# Patient Record
Sex: FEMALE | Race: WHITE | Employment: STUDENT | URBAN - METROPOLITAN AREA
[De-identification: names, ages, dates, MRNs, and addresses within clinical notes are randomized per-mention and may not be internally consistent; named-entity substitution may affect disease eponyms.]

---

## 2017-01-31 RX ORDER — METHYLPHENIDATE HYDROCHLORIDE 36 MG/1
36 TABLET ORAL DAILY
Qty: 30 TABLET | Refills: 0 | Status: SHIPPED | OUTPATIENT
Start: 2017-03-02 | End: 2017-04-01

## 2017-01-31 RX ORDER — METHYLPHENIDATE HYDROCHLORIDE 36 MG/1
36 TABLET ORAL DAILY
Qty: 30 TABLET | Refills: 0 | Status: SHIPPED | OUTPATIENT
Start: 2017-01-31 | End: 2017-03-02

## 2017-01-31 RX ORDER — METHYLPHENIDATE HYDROCHLORIDE 36 MG/1
36 TABLET ORAL DAILY
Qty: 30 TABLET | Refills: 0 | Status: SHIPPED | OUTPATIENT
Start: 2017-04-01 | End: 2017-05-01

## 2017-01-31 NOTE — TELEPHONE ENCOUNTER
Patient notified of Rx's for Methylphenidate 36 mg x3 ready for  at  [facility] location. Patient voiced understanding.

## 2017-01-31 NOTE — TELEPHONE ENCOUNTER
Pt is going to need a refill on rx: Pt is away is a school and her mother can pick this script up 02/01 or 02/02 because she leaves Friday to go down to pt school.        Current Outpatient Prescriptions:  Methylphenidate HCl ER (CONCERTA) 36 MG Oral Tab CR

## 2017-01-31 NOTE — TELEPHONE ENCOUNTER
I have not seen pt since 11/2015. Mita Marcos i will approve it this time but not again until seen by me.

## 2017-02-24 ENCOUNTER — OFFICE VISIT (OUTPATIENT)
Dept: FAMILY MEDICINE CLINIC | Facility: CLINIC | Age: 22
End: 2017-02-24

## 2017-02-24 VITALS
SYSTOLIC BLOOD PRESSURE: 102 MMHG | HEART RATE: 86 BPM | HEIGHT: 63 IN | BODY MASS INDEX: 18.96 KG/M2 | TEMPERATURE: 99 F | DIASTOLIC BLOOD PRESSURE: 71 MMHG | WEIGHT: 107 LBS

## 2017-02-24 DIAGNOSIS — J30.1 SEASONAL ALLERGIC RHINITIS DUE TO POLLEN: ICD-10-CM

## 2017-02-24 DIAGNOSIS — J32.9 SINUSITIS, UNSPECIFIED CHRONICITY, UNSPECIFIED LOCATION: Primary | ICD-10-CM

## 2017-02-24 DIAGNOSIS — R05.9 COUGH: ICD-10-CM

## 2017-02-24 DIAGNOSIS — R11.11 VOMITING WITHOUT NAUSEA, INTRACTABILITY OF VOMITING NOT SPECIFIED, UNSPECIFIED VOMITING TYPE: ICD-10-CM

## 2017-02-24 PROCEDURE — 99214 OFFICE O/P EST MOD 30 MIN: CPT | Performed by: FAMILY MEDICINE

## 2017-02-24 PROCEDURE — 99212 OFFICE O/P EST SF 10 MIN: CPT | Performed by: FAMILY MEDICINE

## 2017-02-24 RX ORDER — FLUTICASONE PROPIONATE 50 MCG
2 SPRAY, SUSPENSION (ML) NASAL DAILY
Qty: 1 BOTTLE | Refills: 6 | Status: SHIPPED | OUTPATIENT
Start: 2017-02-24 | End: 2017-06-24

## 2017-02-24 RX ORDER — CLARITHROMYCIN 500 MG/1
500 TABLET, COATED ORAL 2 TIMES DAILY
Qty: 20 TABLET | Refills: 0 | Status: SHIPPED | OUTPATIENT
Start: 2017-02-24 | End: 2017-03-06

## 2017-02-24 NOTE — PROGRESS NOTES
Patient ID: Ml Wharton is a 22year old female. HPI  Patient presents with:  ER F/U: [facility]   Cold    [facility]/2017 after she was vomiting that morning for about 5 times.   She went to the hospital and Left Ear: Tympanic membrane normal.   Nose: Mucosal edema and rhinorrhea present. No sinus tenderness. Right sinus exhibits no maxillary sinus tenderness. Left sinus exhibits no maxillary sinus tenderness.    Nose: Pale boggy nasal mucosa with clear rhino already. Follow up if symptoms persist.  Take medicine (if given) as prescribed. Approach to treatment discussed and patient/family member understands and agrees to plan.            Garrett Trejo, DO  2/24/2017

## 2017-04-06 ENCOUNTER — TELEPHONE (OUTPATIENT)
Dept: FAMILY MEDICINE CLINIC | Facility: CLINIC | Age: 22
End: 2017-04-06

## 2017-04-06 DIAGNOSIS — Z30.9 ENCOUNTER FOR CONTRACEPTIVE MANAGEMENT, UNSPECIFIED TYPE: Primary | ICD-10-CM

## 2017-04-06 NOTE — TELEPHONE ENCOUNTER
I'm sorry, the phone room does not book appts for OBGYN. However, will pt need a referral to see Dr. Elliot Merlin? She has an HMO plan.

## 2017-04-06 NOTE — TELEPHONE ENCOUNTER
Dr. Miah Adhikari please see message below.  Please advise patient also dose see Gyne Dr. Genoveva Scanlon

## 2017-04-06 NOTE — TELEPHONE ENCOUNTER
Pt requesting apt for arm birthcontrol to be changed, Pt states last was inserted august 2014. Please Advise.

## 2017-04-10 NOTE — TELEPHONE ENCOUNTER
Thank you Dr. Sharda Da Silva. I called pt and informed her of message below and let her know that she has a referral on file. Pt was transferred to obgyn to book an appt.

## 2017-05-12 ENCOUNTER — OFFICE VISIT (OUTPATIENT)
Dept: OBGYN CLINIC | Facility: CLINIC | Age: 22
End: 2017-05-12

## 2017-05-12 VITALS
BODY MASS INDEX: 19.38 KG/M2 | DIASTOLIC BLOOD PRESSURE: 60 MMHG | HEIGHT: 62.7 IN | WEIGHT: 108 LBS | SYSTOLIC BLOOD PRESSURE: 92 MMHG | HEART RATE: 93 BPM

## 2017-05-12 DIAGNOSIS — Z01.419 ENCOUNTER FOR GYNECOLOGICAL EXAMINATION WITHOUT ABNORMAL FINDING: Primary | ICD-10-CM

## 2017-05-12 PROCEDURE — 99395 PREV VISIT EST AGE 18-39: CPT | Performed by: OBSTETRICS & GYNECOLOGY

## 2017-05-12 NOTE — PROGRESS NOTES
Well Woman Exam    HPI:  The patient is a Sheryl female who presents for annual exam. She has a Nexplanon in place that was placed by Kirkwood Goldmann in 2014. Due out in August or before August. She would like another Nexplanon.  No periods for two years and starting t outpatient prescriptions:   •  Fluticasone Propionate 50 MCG/ACT Nasal Suspension, 2 sprays by Nasal route daily. , Disp: 1 Bottle, Rfl: 6  •  Methylphenidate HCl ER (CONCERTA) 36 MG Oral Tab CR, Take 1 tablet (36 mg total) by mouth every morning., Disp: 30 guidelines with the patient   2. No pap smear based on age- to be done at 23yo  2. Contraception:   1. Nexplanon  2. To be replaced prior to Aug 2017- reviewed risks and benefits of Nexplanon   3. Reviewed need for condom use  3. Breast Health:     1.  Revi

## 2017-05-25 ENCOUNTER — OFFICE VISIT (OUTPATIENT)
Dept: OBGYN CLINIC | Facility: CLINIC | Age: 22
End: 2017-05-25

## 2017-05-25 VITALS
DIASTOLIC BLOOD PRESSURE: 66 MMHG | WEIGHT: 107 LBS | SYSTOLIC BLOOD PRESSURE: 98 MMHG | HEART RATE: 91 BPM | BODY MASS INDEX: 19 KG/M2

## 2017-05-25 DIAGNOSIS — Z32.00 PREGNANCY EXAMINATION OR TEST, PREGNANCY UNCONFIRMED: Primary | ICD-10-CM

## 2017-05-25 DIAGNOSIS — Z30.46 ENCOUNTER FOR REMOVAL OF SUBDERMAL CONTRACEPTIVE IMPLANT: ICD-10-CM

## 2017-05-25 DIAGNOSIS — Z30.017 INSERTION OF IMPLANTABLE SUBDERMAL CONTRACEPTIVE: ICD-10-CM

## 2017-05-25 PROCEDURE — 11983 REMOVE/INSERT DRUG IMPLANT: CPT | Performed by: OBSTETRICS & GYNECOLOGY

## 2017-05-25 PROCEDURE — 81025 URINE PREGNANCY TEST: CPT | Performed by: OBSTETRICS & GYNECOLOGY

## 2017-05-25 NOTE — PROCEDURES
Procedure note: Nexplanon Removal and Insertion     Consent was obtained after explaining the risks and benefits of the procedure. Removal was done first.  The Nexplanon was localized in the right arm just under the skin.  4ml of 1% lidocaine with epinephri

## 2017-11-06 RX ORDER — METHYLPHENIDATE HYDROCHLORIDE 36 MG/1
36 TABLET ORAL DAILY
Qty: 30 TABLET | Refills: 0 | Status: SHIPPED | OUTPATIENT
Start: 2017-11-06 | End: 2017-12-06

## 2017-11-06 NOTE — TELEPHONE ENCOUNTER
Rx was last sent 1/31/17. Message routed to provider to confirm if refill can be sent.      Refill Protocol Appointment Criteria  · Appointment scheduled in the past 6 months or in the next 3 months  Recent Outpatient Visits            5 months ago Pregnanc

## 2018-02-15 ENCOUNTER — TELEPHONE (OUTPATIENT)
Dept: OBGYN CLINIC | Facility: CLINIC | Age: 23
End: 2018-02-15

## 2018-02-15 NOTE — TELEPHONE ENCOUNTER
No further recommendation. Patient to continue to monitor. Known side effect with Nexplanon is irregular bleeding.

## 2018-02-15 NOTE — TELEPHONE ENCOUNTER
Pt would like to speak to [facility] regarding her Bc. Pt states she's been having an irregular period.

## 2018-02-15 NOTE — TELEPHONE ENCOUNTER
Pt has nexplanon in place since 5/17. This is pt's second nexplanon. Pt typically does not have a period on nexplanon. Pt states that for the past month she has had bleeding every 2 weeks. Today it is just dark spotting.  Pt states nexplanon seems to be in

## 2018-02-19 ENCOUNTER — TELEPHONE (OUTPATIENT)
Dept: FAMILY MEDICINE CLINIC | Facility: CLINIC | Age: 23
End: 2018-02-19

## 2018-02-19 DIAGNOSIS — L70.9 ACNE, UNSPECIFIED ACNE TYPE: Primary | ICD-10-CM

## 2018-02-19 NOTE — TELEPHONE ENCOUNTER
Patient is using her roommates medication for her acne and it seems to be helping. She would like to see a Dermatologist to get her own prescription. Please sign referral order if you agree. Thank you.

## 2018-03-09 ENCOUNTER — TELEPHONE (OUTPATIENT)
Dept: OBGYN CLINIC | Facility: CLINIC | Age: 23
End: 2018-03-09

## 2018-03-09 ENCOUNTER — OFFICE VISIT (OUTPATIENT)
Dept: OBGYN CLINIC | Facility: CLINIC | Age: 23
End: 2018-03-09

## 2018-03-09 ENCOUNTER — APPOINTMENT (OUTPATIENT)
Dept: LAB | Facility: HOSPITAL | Age: 23
End: 2018-03-09
Attending: OBSTETRICS & GYNECOLOGY
Payer: COMMERCIAL

## 2018-03-09 VITALS
SYSTOLIC BLOOD PRESSURE: 104 MMHG | DIASTOLIC BLOOD PRESSURE: 69 MMHG | BODY MASS INDEX: 18 KG/M2 | HEART RATE: 81 BPM | WEIGHT: 99.19 LBS

## 2018-03-09 DIAGNOSIS — N92.6 IRREGULAR BLEEDING: ICD-10-CM

## 2018-03-09 DIAGNOSIS — Z12.4 SCREENING FOR MALIGNANT NEOPLASM OF CERVIX: Primary | ICD-10-CM

## 2018-03-09 DIAGNOSIS — N92.6 IRREGULAR BLEEDING: Primary | ICD-10-CM

## 2018-03-09 LAB
HCG SERPL QL: NEGATIVE
TSH SERPL-ACNC: 1.2 UIU/ML (ref 0.45–5.33)

## 2018-03-09 PROCEDURE — 84443 ASSAY THYROID STIM HORMONE: CPT

## 2018-03-09 PROCEDURE — 36415 COLL VENOUS BLD VENIPUNCTURE: CPT

## 2018-03-09 PROCEDURE — 99213 OFFICE O/P EST LOW 20 MIN: CPT | Performed by: OBSTETRICS & GYNECOLOGY

## 2018-03-09 PROCEDURE — 84703 CHORIONIC GONADOTROPIN ASSAY: CPT

## 2018-03-09 NOTE — H&P
HPI:  The patient is a 32-year-old female who presents to discuss irregular bleeding. The patient has a Nexplanon contraceptive device was placed in May 2017.   She has had episodes where she skips cycles however over the last month she has had intermitt Hypertension Maternal Grandfather    • Hypertension Paternal Grandfather    • Glaucoma Other    • Arrhythmia Neg        MEDICATIONS:    Current Outpatient Prescriptions:   •  Methylphenidate HCl ER (CONCERTA) 36 MG Oral Tab CR, Take 1 tablet (36 mg total) cervix  -     THINPREP PAP WITH HPV REFLEX REQUEST B; Future  -     THINPREP PAP WITH HPV REFLEX REQUEST B  -     THINPREP PAP WITH HPV REFLEX REQUEST;  Future  -     THINPREP PAP WITH HPV REFLEX REQUEST    Irregular bleeding  -     CHLAMYDIA/GONOCOCCUS, NA

## 2018-03-11 LAB
C TRACH DNA SPEC QL NAA+PROBE: NEGATIVE
N GONORRHOEA DNA SPEC QL NAA+PROBE: NEGATIVE

## 2018-03-12 LAB — T VAGINALIS RRNA SPEC QL NAA+PROBE: NEGATIVE

## 2018-04-25 RX ORDER — METHYLPHENIDATE HYDROCHLORIDE 36 MG/1
36 TABLET ORAL DAILY
Qty: 30 TABLET | Refills: 0 | Status: SHIPPED | OUTPATIENT
Start: 2018-05-23 | End: 2018-06-22

## 2018-04-25 RX ORDER — METHYLPHENIDATE HYDROCHLORIDE 36 MG/1
36 TABLET ORAL DAILY
Qty: 30 TABLET | Refills: 0 | Status: SHIPPED | OUTPATIENT
Start: 2018-04-25 | End: 2018-05-25

## 2018-04-25 RX ORDER — METHYLPHENIDATE HYDROCHLORIDE 36 MG/1
36 TABLET ORAL EVERY MORNING
Qty: 30 TABLET | Refills: 0 | Status: SHIPPED | OUTPATIENT
Start: 2018-04-25 | End: 2018-11-13

## 2018-04-25 NOTE — TELEPHONE ENCOUNTER
Left detail message on patient personal voicemail of Rx for Concerta  x3 ready for  at  [facility] location.

## 2018-05-16 ENCOUNTER — OFFICE VISIT (OUTPATIENT)
Dept: DERMATOLOGY CLINIC | Facility: CLINIC | Age: 23
End: 2018-05-16

## 2018-05-16 ENCOUNTER — TELEPHONE (OUTPATIENT)
Dept: DERMATOLOGY CLINIC | Facility: CLINIC | Age: 23
End: 2018-05-16

## 2018-05-16 VITALS — WEIGHT: 105 LBS | BODY MASS INDEX: 18.61 KG/M2 | HEIGHT: 63 IN

## 2018-05-16 DIAGNOSIS — L70.0 ACNE VULGARIS: Primary | ICD-10-CM

## 2018-05-16 PROCEDURE — 99202 OFFICE O/P NEW SF 15 MIN: CPT | Performed by: DERMATOLOGY

## 2018-05-16 PROCEDURE — 99212 OFFICE O/P EST SF 10 MIN: CPT | Performed by: DERMATOLOGY

## 2018-05-16 RX ORDER — ADAPALENE AND BENZOYL PEROXIDE 3; 25 MG/G; MG/G
1 GEL TOPICAL DAILY
Qty: 45 G | Refills: 6 | Status: SHIPPED | OUTPATIENT
Start: 2018-05-16 | End: 2021-04-06

## 2018-05-16 RX ORDER — CLINDAMYCIN PHOSPHATE 10 MG/ML
1 LOTION TOPICAL 2 TIMES DAILY
Qty: 60 ML | Refills: 11 | Status: SHIPPED | OUTPATIENT
Start: 2018-05-16 | End: 2018-06-15

## 2018-05-19 NOTE — TELEPHONE ENCOUNTER
Which medication? Pt has more than 1 rx. epiduo forte? Did pharmacy run coupon--regular generic epiduo available from [facility].

## 2018-05-21 NOTE — TELEPHONE ENCOUNTER
s/w [facility] - they ran the coupon and I went through for $75 pt just hasn't picked it up yet.

## 2018-05-27 NOTE — PROGRESS NOTES
Angela Freedman is a 25year old female. Patient presents with:  Acne: random breakouts facial ,chin ,has tried epiduo with good results             Cefprozil;  Cefzil; Codeine    Current Outpatient Prescriptions:  Adapalene-Benzoyl Peroxide (EPIDUO [place]  Cefzil                  RASH  Codeine                 NAUSEA AND VOMITING    Past Medical History:   Diagnosis Date   • ALLERGIC RHINITIS    • ASTHMA    • Asthma     Albuterol/flovent prn   • Tonsillar hypertrophy 2008    Tonsillectomy nourished,oriented to person, place, time, and situation,in no apparent distress  HEENT: atraumatic, normocephalic  NECK: supple,no adenopathy,  EXTREMITIES: no cyanosis, clubbing or edema    SKIN:  multiple inflammatory papules,   nodules  , prominent atr past 48 hour(s)). Meds This Visit:      Imaging Orders:  None     Referral Orders:  No orders of the defined types were placed in this encounter.         5/27/2018  Stefan Shankar      The patient indicates understanding of these issues and agrees to the

## 2018-05-29 ENCOUNTER — OFFICE VISIT (OUTPATIENT)
Dept: FAMILY MEDICINE CLINIC | Facility: CLINIC | Age: 23
End: 2018-05-29

## 2018-05-29 VITALS
WEIGHT: 105 LBS | HEART RATE: 65 BPM | DIASTOLIC BLOOD PRESSURE: 63 MMHG | SYSTOLIC BLOOD PRESSURE: 98 MMHG | TEMPERATURE: 98 F | BODY MASS INDEX: 19 KG/M2

## 2018-05-29 DIAGNOSIS — N39.0 URINARY TRACT INFECTION WITHOUT HEMATURIA, SITE UNSPECIFIED: Primary | ICD-10-CM

## 2018-05-29 PROCEDURE — 99212 OFFICE O/P EST SF 10 MIN: CPT | Performed by: PHYSICIAN ASSISTANT

## 2018-05-29 PROCEDURE — 81002 URINALYSIS NONAUTO W/O SCOPE: CPT | Performed by: PHYSICIAN ASSISTANT

## 2018-05-29 PROCEDURE — 99213 OFFICE O/P EST LOW 20 MIN: CPT | Performed by: PHYSICIAN ASSISTANT

## 2018-05-29 RX ORDER — CIPROFLOXACIN 250 MG/1
250 TABLET, FILM COATED ORAL 2 TIMES DAILY
Qty: 6 TABLET | Refills: 0 | Status: SHIPPED | OUTPATIENT
Start: 2018-05-29 | End: 2018-06-01

## 2018-05-29 NOTE — PROGRESS NOTES
HPI:    Patient ID: Feliz Simmonds is a 25year old female. Patient presents for pain with urination for past two days that was severe this morning and has since improved. She denies any back pain or flank pain, fever or chills, nausea or vomiting.   Ky File alert and oriented to person, place, and time. No cranial nerve deficit. Skin: Skin is warm and dry. Psychiatric: She has a normal mood and affect. Vitals reviewed.              ASSESSMENT/PLAN:   Urinary tract infection without hematuria, site unspec

## 2018-09-19 ENCOUNTER — OFFICE VISIT (OUTPATIENT)
Dept: FAMILY MEDICINE CLINIC | Facility: CLINIC | Age: 23
End: 2018-09-19

## 2018-09-19 VITALS
HEART RATE: 71 BPM | WEIGHT: 102 LBS | BODY MASS INDEX: 18.07 KG/M2 | HEIGHT: 63 IN | DIASTOLIC BLOOD PRESSURE: 68 MMHG | SYSTOLIC BLOOD PRESSURE: 103 MMHG

## 2018-09-19 DIAGNOSIS — R10.13 EPIGASTRIC PAIN: Primary | ICD-10-CM

## 2018-09-19 DIAGNOSIS — J30.1 SEASONAL ALLERGIC RHINITIS DUE TO POLLEN: ICD-10-CM

## 2018-09-19 PROCEDURE — 99213 OFFICE O/P EST LOW 20 MIN: CPT | Performed by: NURSE PRACTITIONER

## 2018-09-19 NOTE — PROGRESS NOTES
HPI    Pt here for abdominal bloating and cramping in am for past month or so. Had lactose problems as a child. Just started a new job and moved back home from college. Has decreased coffee intake and is now drinking green tea.  Diet is usually pretty h Social History    Socioeconomic History      Marital status: Single      Spouse name: Not on file      Number of children: Not on file      Years of education: Not on file      Highest education level: Not on file    Social Needs      Financial resou Ear: Tympanic membrane and ear canal normal. No cerumen present  Eyes: Conjunctivae and EOM are normal. Pupils are equal, round, and reactive to light. Right eye exhibits no discharge. Left eye exhibits no discharge. Neck: Normal range of motion.  Neck delgadillo

## 2018-09-19 NOTE — PATIENT INSTRUCTIONS
Heidy Spar ALLERGIC RHINITIS    -Take otc allergy medications as directed (over the counter, generic Claritin, Zyrtec or Allegra)    -use of fluticasone nasal spray as advised (Flonase)-this is now available as a generic, over the counter spray (fluticasone) if your

## 2018-11-13 ENCOUNTER — OFFICE VISIT (OUTPATIENT)
Dept: FAMILY MEDICINE CLINIC | Facility: CLINIC | Age: 23
End: 2018-11-13

## 2018-11-13 VITALS
SYSTOLIC BLOOD PRESSURE: 105 MMHG | HEART RATE: 82 BPM | BODY MASS INDEX: 17.89 KG/M2 | WEIGHT: 101 LBS | DIASTOLIC BLOOD PRESSURE: 69 MMHG | HEIGHT: 63 IN

## 2018-11-13 DIAGNOSIS — J30.1 SEASONAL ALLERGIC RHINITIS DUE TO POLLEN: ICD-10-CM

## 2018-11-13 DIAGNOSIS — Z00.00 WELL ADULT EXAM: Primary | ICD-10-CM

## 2018-11-13 PROBLEM — R10.13 EPIGASTRIC PAIN: Status: RESOLVED | Noted: 2018-09-19 | Resolved: 2018-11-13

## 2018-11-13 PROCEDURE — 99395 PREV VISIT EST AGE 18-39: CPT | Performed by: NURSE PRACTITIONER

## 2018-11-13 NOTE — PATIENT INSTRUCTIONS
Prevention Guidelines, Women Ages 25 to 44  Screening tests and vaccines are an important part of managing your health. A screening test is done to find possible disorders or diseases in people who don't have any symptoms.  The goal is to find a disease e Type 2 diabetes All women with prediabetes Every year   Gonorrhea Sexually active women at increased risk for infection At routine exams   Hepatitis C Anyone at increased risk At routine exams   HIV All women should be tested at least once for HIV between Meningococcal Women at increased risk for infection should talk with their healthcare provider 1 or more doses   Pneumococcal conjugate vaccine (PCV13) and pneumococcal polysaccharide vaccine (PPSV23) Women at increased risk for infection should talk with © 4774-1456 [address]. [address] [facility], [address]. All rights reserved. This information is not intended as a substitute for professional medical care. Always follow your healthcare professional's instructions.       Everythin

## 2018-11-13 NOTE — PROGRESS NOTES
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 Does not need TB test.     Takes allergy meds prn; is not having any flares with asthma. Eats healthy, tries to exercise regularly.     Last pap-never ; has ha • Other (Other[other]) Maternal Grandmother         Factor V Leiden   • Diabetes Maternal Grandfather    • Hypertension Maternal Grandfather    • Hypertension Paternal Grandfather    • Glaucoma Other    • Arrhythmia Neg        Social History    Socioecon normal. No cerumen present  Left Ear: Tympanic membrane and ear canal normal. No cerumen present  Mouth/Throat: Oropharynx is clear and moist. No posterior oropharyngeal erythema.    Pale, boggy turbinates bilaterally     Eyes: Conjunctivae and EOM are norm

## 2018-11-15 PROBLEM — Z01.419 WELL WOMAN EXAM WITH ROUTINE GYNECOLOGICAL EXAM: Status: ACTIVE | Noted: 2018-11-15

## 2018-12-20 ENCOUNTER — LAB ENCOUNTER (OUTPATIENT)
Dept: LAB | Age: 23
End: 2018-12-20
Attending: FAMILY MEDICINE
Payer: COMMERCIAL

## 2018-12-20 ENCOUNTER — OFFICE VISIT (OUTPATIENT)
Dept: FAMILY MEDICINE CLINIC | Facility: CLINIC | Age: 23
End: 2018-12-20

## 2018-12-20 VITALS
BODY MASS INDEX: 18.04 KG/M2 | HEART RATE: 85 BPM | HEIGHT: 63 IN | WEIGHT: 101.81 LBS | DIASTOLIC BLOOD PRESSURE: 62 MMHG | SYSTOLIC BLOOD PRESSURE: 100 MMHG

## 2018-12-20 DIAGNOSIS — R11.0 NAUSEA: ICD-10-CM

## 2018-12-20 DIAGNOSIS — R11.0 NAUSEA: Primary | ICD-10-CM

## 2018-12-20 PROCEDURE — 80053 COMPREHEN METABOLIC PANEL: CPT

## 2018-12-20 PROCEDURE — 99213 OFFICE O/P EST LOW 20 MIN: CPT | Performed by: FAMILY MEDICINE

## 2018-12-20 PROCEDURE — 84443 ASSAY THYROID STIM HORMONE: CPT

## 2018-12-20 PROCEDURE — 36415 COLL VENOUS BLD VENIPUNCTURE: CPT

## 2018-12-20 PROCEDURE — 99212 OFFICE O/P EST SF 10 MIN: CPT | Performed by: FAMILY MEDICINE

## 2018-12-20 PROCEDURE — 85025 COMPLETE CBC W/AUTO DIFF WBC: CPT

## 2018-12-20 PROCEDURE — 83013 H PYLORI (C-13) BREATH: CPT

## 2018-12-20 RX ORDER — OMEPRAZOLE 20 MG/1
20 CAPSULE, DELAYED RELEASE ORAL
Qty: 30 CAPSULE | Refills: 2 | Status: SHIPPED | OUTPATIENT
Start: 2018-12-20 | End: 2019-12-20

## 2018-12-20 NOTE — PROGRESS NOTES
Caryn Sosa is a 26year old female. Patient presents with:  Abdominal Pain  Diarrhea    HPI:   Pt reports she saw Marsha Arauz NP in September for same symptoms. Reports gets nauseated sometimes but so hungry. Occurs when eats. Takes zantac and helps.  Carlos HELICOBACTER PYLORI BREATH TEST, ADULT (>17); Future  - CBC WITH DIFFERENTIAL WITH PLATELET; Future  - COMP METABOLIC PANEL (14); Future  - TSH W REFLEX TO FREE T4; Future      The patient indicates understanding of these issues and agrees to the plan.

## 2018-12-26 ENCOUNTER — LAB ENCOUNTER (OUTPATIENT)
Dept: LAB | Facility: HOSPITAL | Age: 23
End: 2018-12-26
Attending: FAMILY MEDICINE
Payer: COMMERCIAL

## 2018-12-26 ENCOUNTER — TELEPHONE (OUTPATIENT)
Dept: FAMILY MEDICINE CLINIC | Facility: CLINIC | Age: 23
End: 2018-12-26

## 2018-12-26 DIAGNOSIS — R11.0 NAUSEA: Primary | ICD-10-CM

## 2018-12-26 PROCEDURE — 83013 H PYLORI (C-13) BREATH: CPT

## 2018-12-26 NOTE — TELEPHONE ENCOUNTER
Dr Edwige Ball, please note. Arnoldo Barroso, [address], is with patient who got a phone call from [facility], said she needed to re-do H Pylori test and lab needed a new order. Current order says in process. Arnoldo Barroso will contact lab to see if new order is needed.  Marcell Boyd

## 2018-12-27 NOTE — PROGRESS NOTES
Tests are all normal. Please continue with current treatment plan. Still waiting for h.  Pylori results

## 2019-01-03 ENCOUNTER — TELEPHONE (OUTPATIENT)
Dept: FAMILY MEDICINE CLINIC | Facility: CLINIC | Age: 24
End: 2019-01-03

## 2019-01-03 NOTE — TELEPHONE ENCOUNTER
Patient following on results, the following notes reviewed with patient's understanding and agreement. No other concerns at this time.       Notes recorded by Chelle Gomez, [address] on 1/2/2019 at 10:38 AM CST  Letter sent  ------    Notes recorded by Kin Angelucci

## 2019-02-02 ENCOUNTER — NURSE TRIAGE (OUTPATIENT)
Dept: OTHER | Age: 24
End: 2019-02-02

## 2019-02-02 RX ORDER — ONDANSETRON 4 MG/1
4 TABLET, ORALLY DISINTEGRATING ORAL EVERY 8 HOURS PRN
Qty: 20 TABLET | Refills: 0 | Status: SHIPPED | OUTPATIENT
Start: 2019-02-02 | End: 2020-01-24

## 2019-02-02 NOTE — TELEPHONE ENCOUNTER
Patient notified of MD's recommendation. Patient verbalized understanding. Patient states she has taken zofran before, however she will still monitor for rash.

## 2019-02-02 NOTE — TELEPHONE ENCOUNTER
Action Requested: Summary for Provider     []  Critical Lab, Recommendations Needed  [x] Need Additional Advice  []   FYI    []   Need Orders  [x] Need Medications Sent to Pharmacy  []  Other     SUMMARY: pt asking if doctor would send Zofran prescription

## 2019-09-16 ENCOUNTER — TELEPHONE (OUTPATIENT)
Dept: GASTROENTEROLOGY | Facility: CLINIC | Age: 24
End: 2019-09-16

## 2019-09-16 ENCOUNTER — OFFICE VISIT (OUTPATIENT)
Dept: GASTROENTEROLOGY | Facility: CLINIC | Age: 24
End: 2019-09-16

## 2019-09-16 VITALS
HEIGHT: 63 IN | BODY MASS INDEX: 16.12 KG/M2 | DIASTOLIC BLOOD PRESSURE: 62 MMHG | SYSTOLIC BLOOD PRESSURE: 93 MMHG | HEART RATE: 69 BPM | WEIGHT: 91 LBS

## 2019-09-16 DIAGNOSIS — R10.10 UPPER ABDOMINAL PAIN: Primary | ICD-10-CM

## 2019-09-16 DIAGNOSIS — R10.13 DYSPEPSIA: Primary | ICD-10-CM

## 2019-09-16 DIAGNOSIS — G43.A0 CYCLICAL VOMITING WITH NAUSEA, INTRACTABILITY OF VOMITING NOT SPECIFIED: ICD-10-CM

## 2019-09-16 DIAGNOSIS — R63.4 WEIGHT LOSS: ICD-10-CM

## 2019-09-16 DIAGNOSIS — R11.0 NAUSEA: ICD-10-CM

## 2019-09-16 PROBLEM — R11.15 CYCLICAL VOMITING WITH NAUSEA: Status: ACTIVE | Noted: 2019-09-16

## 2019-09-16 PROCEDURE — 99244 OFF/OP CNSLTJ NEW/EST MOD 40: CPT | Performed by: INTERNAL MEDICINE

## 2019-09-16 NOTE — TELEPHONE ENCOUNTER
Scheduled for:  EGD - [phone]  Provider Name:  Dr. Damien Dao  Date:  10/21/19  Location:  [facility]  Sedation:  [facility]  Time:  (pt is aware that [facility] will call with arrival time)  Prep:  NPO after midnight, Prep instructions were given to pt in the office, pt verbalized u

## 2019-09-16 NOTE — PATIENT INSTRUCTIONS
1. Schedule upper endoscopy (EGD) with MAC [Diagnosis: dyspepsia, weight loss, nausea]  2.  Abdominal ultrasound -call to schedule        Cyclic vomiting syndrome:  [email or web address]-

## 2019-09-16 NOTE — H&P
[facility] - Gastroenterology                                                                                                               Reason for consult: n SURGICAL HISTORY  2008    tonsillectomy   • TONSILLECTOMY        Family Hx:   Family History   Problem Relation Age of Onset   • Asthma Sister    • Asthma Maternal Grandmother    • Other (Other) Maternal Grandmother         Factor V Leiden   • Diabetes Mat breastfeeding.     Gen- Patient appears comfortable and in no acute discomfort, thin  HEENT: the sclera appears anicteric, oropharynx clear, mucus membranes appear moist  CV- regular rate and rhythm, the extremities are warm and well perfused   Lung- Moves [Diagnosis: dyspepsia, weight loss, nausea]  2. Abdominal ultrasound -call to schedule  3. Food journal, increase calories about 15-20% per day. Copy of this note sent to Dr. Irineo Najjar.     EGD consent: I have discussed the risks, benefits, and alternatives t

## 2019-10-05 ENCOUNTER — HOSPITAL ENCOUNTER (OUTPATIENT)
Dept: ULTRASOUND IMAGING | Facility: HOSPITAL | Age: 24
Discharge: HOME | End: 2019-10-05
Attending: INTERNAL MEDICINE
Payer: COMMERCIAL

## 2019-10-05 DIAGNOSIS — R10.10 UPPER ABDOMINAL PAIN: ICD-10-CM

## 2019-10-05 PROCEDURE — 76700 US EXAM ABDOM COMPLETE: CPT | Performed by: INTERNAL MEDICINE

## 2019-10-16 ENCOUNTER — IMMUNIZATION (OUTPATIENT)
Dept: FAMILY MEDICINE CLINIC | Facility: CLINIC | Age: 24
End: 2019-10-16

## 2019-10-16 DIAGNOSIS — Z23 NEED FOR VACCINATION: ICD-10-CM

## 2019-10-16 PROCEDURE — 90686 IIV4 VACC NO PRSV 0.5 ML IM: CPT | Performed by: NURSE PRACTITIONER

## 2019-10-16 PROCEDURE — 90471 IMMUNIZATION ADMIN: CPT | Performed by: NURSE PRACTITIONER

## 2019-10-21 PROCEDURE — 88305 TISSUE EXAM BY PATHOLOGIST: CPT | Performed by: INTERNAL MEDICINE

## 2019-10-21 PROCEDURE — 88312 SPECIAL STAINS GROUP 1: CPT | Performed by: INTERNAL MEDICINE

## 2019-10-22 ENCOUNTER — LAB REQUISITION (OUTPATIENT)
Dept: LAB | Facility: HOSPITAL | Age: 24
End: 2019-10-22
Payer: COMMERCIAL

## 2019-10-22 DIAGNOSIS — Z01.89 ENCOUNTER FOR OTHER SPECIFIED SPECIAL EXAMINATIONS: ICD-10-CM

## 2019-10-29 ENCOUNTER — TELEPHONE (OUTPATIENT)
Dept: GASTROENTEROLOGY | Facility: CLINIC | Age: 24
End: 2019-10-29

## 2019-10-29 RX ORDER — SUCRALFATE 1 G/1
1 TABLET ORAL
Qty: 90 TABLET | Refills: 0 | Status: SHIPPED | OUTPATIENT
Start: 2019-10-29 | End: 2019-11-28

## 2019-10-29 NOTE — TELEPHONE ENCOUNTER
Left msg for patient re: path. Non hypylori gastritis. Normal duodenum. Start Carafate 1 g TID. Possible bile acid reflux? See me in clinic in 2-3 months.

## 2020-01-24 NOTE — TELEPHONE ENCOUNTER
Review pended refill request as it does not fall under a protocol.     Last Rx: 2/2019  LOV: 1 year ago

## 2020-01-25 RX ORDER — ONDANSETRON 4 MG/1
TABLET, ORALLY DISINTEGRATING ORAL
Qty: 20 TABLET | Refills: 0 | Status: SHIPPED | OUTPATIENT
Start: 2020-01-25 | End: 2020-10-17

## 2020-05-15 ENCOUNTER — TELEPHONE (OUTPATIENT)
Dept: PEDIATRICS CLINIC | Facility: CLINIC | Age: 25
End: 2020-05-15

## 2020-05-15 NOTE — TELEPHONE ENCOUNTER
Pt requesting Nexplanon exchange. Current Nexplanon was inserted on 5/25/17. Last annual was 5/12/17. Informed pt due to Covid 19 precautions there are changes to scheduling and nurse will check with Racquel Diggs on how to proceed with scheduling. Informed pt we will call her back as soon as we have a response.    Racquel Diggs, should pt come in for annual first? If yes, do we schedule in next available or wait until June/July for annual?

## 2020-05-15 NOTE — TELEPHONE ENCOUNTER
Pt. States that she is returning the nurses call. Pt. Requesting to speak to the nurse about replacement of Nexplanon. Due on 5/25/2020.

## 2020-05-16 NOTE — TELEPHONE ENCOUNTER
Can scheduled annual first and then Nexplanon exchange to follow sometime. If I have an opening 5/21 or the following week that is fine otherwise will need to book for June.

## 2020-05-18 ENCOUNTER — PATIENT MESSAGE (OUTPATIENT)
Dept: OBGYN CLINIC | Facility: CLINIC | Age: 25
End: 2020-05-18

## 2020-05-18 NOTE — TELEPHONE ENCOUNTER
From: Roberto Memory  To: Varinder Smith MD  Sent: 5/18/2020 10:36 AM CDT  Subject: Non-Urgent Medical Question    Hi Dr. Sergei Miranda,     I just wanted to reach out because I was able to get an appointment for the 28th of May for my annual check up that I n

## 2020-05-18 NOTE — TELEPHONE ENCOUNTER
Unfortunately cannot do exchange at the same apt as annual. There is no harm in leaving it in slightly longer than expiration date. Plan for condom use after expiration date to be safe although it is likely effective.  Plan for us to exchange it in [place]

## 2020-05-28 ENCOUNTER — OFFICE VISIT (OUTPATIENT)
Dept: OBGYN CLINIC | Facility: CLINIC | Age: 25
End: 2020-05-28

## 2020-05-28 VITALS
HEIGHT: 63 IN | SYSTOLIC BLOOD PRESSURE: 99 MMHG | BODY MASS INDEX: 18.21 KG/M2 | DIASTOLIC BLOOD PRESSURE: 64 MMHG | HEART RATE: 96 BPM | WEIGHT: 102.81 LBS

## 2020-05-28 DIAGNOSIS — Z12.4 CERVICAL CANCER SCREENING: ICD-10-CM

## 2020-05-28 DIAGNOSIS — Z30.09 BIRTH CONTROL COUNSELING: ICD-10-CM

## 2020-05-28 DIAGNOSIS — Z01.419 ENCOUNTER FOR GYNECOLOGICAL EXAMINATION WITHOUT ABNORMAL FINDING: Primary | ICD-10-CM

## 2020-05-28 PROCEDURE — 99395 PREV VISIT EST AGE 18-39: CPT | Performed by: OBSTETRICS & GYNECOLOGY

## 2020-05-28 NOTE — PROGRESS NOTES
Well Woman Exam    HPI:  The patient is a 24yo female who presents today for annual exam. She has no complaints. Her Nexplanon was placed May of 2017 and due out. She would like it exchanged for another one.  She has some irregular menses, but she is happy connections:        Talks on phone: Not on file        Gets together: Not on file        Attends Baptism service: Not on file        Active member of club or organization: Not on file        Attends meetings of clubs or organizations: Not on file Denies easy bruising   Skin/Breast:  Denies any breast pain, lumps, or discharge.    Neurological:  denies headaches, blurred or double vision   Psychiatric: denies depression or anxiety       05/28/20  0957   BP: 99/64   Pulse: 96       PHYSICAL EXAM:   Co

## 2020-06-10 ENCOUNTER — OFFICE VISIT (OUTPATIENT)
Dept: OBGYN CLINIC | Facility: CLINIC | Age: 25
End: 2020-06-10

## 2020-06-10 VITALS
SYSTOLIC BLOOD PRESSURE: 120 MMHG | HEART RATE: 92 BPM | DIASTOLIC BLOOD PRESSURE: 74 MMHG | WEIGHT: 100.81 LBS | BODY MASS INDEX: 18 KG/M2

## 2020-06-10 DIAGNOSIS — Z30.46 ENCOUNTER FOR REMOVAL OF SUBDERMAL CONTRACEPTIVE IMPLANT: ICD-10-CM

## 2020-06-10 DIAGNOSIS — Z30.017 INSERTION OF IMPLANTABLE SUBDERMAL CONTRACEPTIVE: ICD-10-CM

## 2020-06-10 PROCEDURE — 11983 REMOVE/INSERT DRUG IMPLANT: CPT | Performed by: OBSTETRICS & GYNECOLOGY

## 2020-06-10 NOTE — PROCEDURES
Procedure note:  Nexplanon Removal and Reinsertion   Consent was obtained from the patient. Consent was obtained after explaining the risks and benefits of the procedure. The Implanon/Nexplanon was localized in the right arm just under the skin.  4ml of

## 2020-10-11 ENCOUNTER — HOSPITAL ENCOUNTER (OUTPATIENT)
Age: 25
Discharge: HOME | End: 2020-10-11
Attending: EMERGENCY MEDICINE
Payer: COMMERCIAL

## 2020-10-11 VITALS
TEMPERATURE: 99 F | OXYGEN SATURATION: 100 % | RESPIRATION RATE: 18 BRPM | BODY MASS INDEX: 17.72 KG/M2 | SYSTOLIC BLOOD PRESSURE: 108 MMHG | HEART RATE: 76 BPM | WEIGHT: 100 LBS | DIASTOLIC BLOOD PRESSURE: 78 MMHG | HEIGHT: 63 IN

## 2020-10-11 DIAGNOSIS — Z20.822 ENCOUNTER FOR LABORATORY TESTING FOR COVID-19 VIRUS: Primary | ICD-10-CM

## 2020-10-11 PROCEDURE — 99213 OFFICE O/P EST LOW 20 MIN: CPT | Performed by: EMERGENCY MEDICINE

## 2020-10-11 NOTE — ED PROVIDER NOTES
Patient Seen in: [facility] In [address] [place] [address]      History   Patient presents with:  Testing    Stated Complaint: TESTING    HPI    Patient is a 27-year-old female who presents to immediate care for asymptomatic COVID testing.   No direct Normal appearance.    HENT:      Right Ear: Tympanic membrane normal.      Left Ear: Tympanic membrane normal.      Nose: Nose normal.      Mouth/Throat:      Mouth: Mucous membranes are moist.   Eyes:      Extraocular Movements: Extraocular movements intac

## 2020-10-17 RX ORDER — ONDANSETRON 4 MG/1
TABLET, ORALLY DISINTEGRATING ORAL
Qty: 20 TABLET | Refills: 0 | Status: SHIPPED | OUTPATIENT
Start: 2020-10-17 | End: 2021-04-06

## 2021-04-03 ENCOUNTER — PATIENT MESSAGE (OUTPATIENT)
Dept: FAMILY MEDICINE CLINIC | Facility: CLINIC | Age: 26
End: 2021-04-03

## 2021-04-04 NOTE — TELEPHONE ENCOUNTER
From: Monica Menjivar  To: Jose Carlos Alvarez MD  Sent: 4/3/2021 2:29 PM CDT  Subject: Other    Hi ! Hope you've been well!  I just wanted to get in touch with you because I've been seeing a therapist recently to dive into my emotions and how they

## 2021-04-06 NOTE — PATIENT INSTRUCTIONS
Treating Anxiety Disorders with Medicine  An anxiety disorder can make you feel nervous or apprehensive, even without a clear reason.  In people age 72 and older, generalized anxiety disorder is one of the most commonly diagnosed anxiety disorders. Many t only the amount of medicine prescribed for you. If you think you may have taken too much, get emergency care right away. Never share your medicines with others. Store these medicines in a safe place that can't be reached by children or visitors.    Keep edwin of addiction, you may not be able to use certain medicines used to treat anxiety disorders. · Medicine interactions. Always check with your pharmacist before using any over-the-counter medicines (OTCs), including herbal supplements.  Some OTCs may interact

## 2021-04-06 NOTE — PROGRESS NOTES
HPI    Patient presents for anxiety. Recently started seeing therapy which is helping. Has been having loss of appetite lately. Anxiety is worsening and when having bad anxiety is losing appetite.   Takes zofran as needed which helps but would like to ta Highest education level: Not on file    Occupational History      Not on file    Tobacco Use      Smoking status: Never Smoker      Smokeless tobacco: Never Used    Vaping Use      Vaping Use: Never used    Substance and Sexual Activity      Alcohol use:  Delonte Muñiz HIVES  Cefzil                  RASH  Codeine                 NAUSEA AND VOMITING    Physical Exam  Vitals and nursing note reviewed. Constitutional:       General: She is not in acute distress. Appearance: Normal appearance.  She is not ill-appearing

## 2021-04-09 DIAGNOSIS — Z23 NEED FOR VACCINATION: ICD-10-CM

## 2021-04-30 ENCOUNTER — TELEPHONE (OUTPATIENT)
Dept: FAMILY MEDICINE CLINIC | Facility: CLINIC | Age: 26
End: 2021-04-30

## 2021-04-30 RX ORDER — ESCITALOPRAM OXALATE 5 MG/1
5 TABLET ORAL DAILY
Qty: 90 TABLET | Refills: 0 | Status: SHIPPED | OUTPATIENT
Start: 2021-04-30 | End: 2021-05-18

## 2021-04-30 NOTE — TELEPHONE ENCOUNTER
Patient states her insurance is recommending that script be changed to 90 day instead of 30. Patient is requesting a 90 script of medication escitalopram 5 MG Oral Tab.

## 2021-05-18 NOTE — PROGRESS NOTES
HPI    Patient presents for anxiety follow up. Seen on 4/6 and started on lexapro. Feels significantly better. Able to eat and has appetite again. Review of Systems   Psychiatric/Behavioral: The patient is nervous/anxious.     All other systems revi Sexual activity: Yes        Birth control/protection: Implant        Comment: Nexplanon    Other Topics      Concerns:        Grew up on a farm: Not Asked        History of tanning: Not Asked        Outdoor occupation: Not Asked        Pt has a pacemaker Head: Normocephalic and atraumatic. Cardiovascular:      Rate and Rhythm: Normal rate and regular rhythm. Heart sounds: Normal heart sounds. No murmur heard. Pulmonary:      Effort: Pulmonary effort is normal. No respiratory distress.       Breat

## 2021-10-23 DIAGNOSIS — F41.9 ANXIETY: ICD-10-CM

## 2021-10-25 RX ORDER — ESCITALOPRAM OXALATE 5 MG/1
TABLET ORAL
Qty: 90 TABLET | Refills: 0 | Status: SHIPPED | OUTPATIENT
Start: 2021-10-25 | End: 2022-01-24

## 2022-01-23 DIAGNOSIS — F41.9 ANXIETY: ICD-10-CM

## 2022-01-24 NOTE — TELEPHONE ENCOUNTER
Please review.  Protocol failed/No protocol      Requested Prescriptions   Pending Prescriptions Disp Refills    ESCITALOPRAM 5 MG Oral Tab [Pharmacy Med Name: ESCITALOPRAM 5 MG TABLET] 90 tablet 0     Sig: TAKE 1 TABLET BY MOUTH EVERY DAY        Psychiatric Non-Scheduled (Anti-Anxiety) Failed - 1/23/2022 12:00 AM        Failed - Appointment in last 6 or next 3 months              Recent Outpatient Visits              8 months ago [address] [place], [employer] Visit    9 months ago [facility] [address], [address] [place], [address]    Office Visit    1 year ago Insertion of implantable subdermal contraceptive    [facility], [facility], [place]    Office Visit    1 year ago Encounter for gynecological examination without abnormal finding    [facility], [address], Sweta Salmeron MD    Office Visit    2 years ago Upper abdominal pain    Tye Parekh MD    Office Visit

## 2022-01-25 RX ORDER — ESCITALOPRAM OXALATE 5 MG/1
5 TABLET ORAL DAILY
Qty: 90 TABLET | Refills: 0 | Status: SHIPPED | OUTPATIENT
Start: 2022-01-25 | End: 2022-03-09

## 2022-03-09 PROBLEM — Z00.00 WELL ADULT EXAM: Status: RESOLVED | Noted: 2018-11-13 | Resolved: 2022-03-09

## 2022-03-09 PROBLEM — Z01.419 WELL WOMAN EXAM WITH ROUTINE GYNECOLOGICAL EXAM: Status: RESOLVED | Noted: 2018-11-15 | Resolved: 2022-03-09

## 2022-03-09 PROBLEM — R10.10 UPPER ABDOMINAL PAIN: Status: RESOLVED | Noted: 2018-09-19 | Resolved: 2022-03-09

## 2022-03-24 ENCOUNTER — OFFICE VISIT (OUTPATIENT)
Dept: ORTHOPEDICS CLINIC | Facility: CLINIC | Age: 27
End: 2022-03-24
Payer: COMMERCIAL

## 2022-03-24 VITALS — WEIGHT: 107 LBS | BODY MASS INDEX: 18.96 KG/M2 | HEIGHT: 63 IN

## 2022-03-24 DIAGNOSIS — B35.1 ONYCHOMYCOSIS: Primary | ICD-10-CM

## 2022-03-24 PROCEDURE — 3008F BODY MASS INDEX DOCD: CPT | Performed by: PODIATRIST

## 2022-03-24 PROCEDURE — 99202 OFFICE O/P NEW SF 15 MIN: CPT | Performed by: PODIATRIST

## 2022-04-30 RX ORDER — ESCITALOPRAM OXALATE 5 MG/1
5 TABLET ORAL DAILY
Qty: 90 TABLET | Refills: 0 | OUTPATIENT
Start: 2022-04-30

## 2022-05-11 NOTE — ASSESSMENT & PLAN NOTE
Discussed diet-low acid, low caffeine. Low spicy and limit fried foods  Avoid lactose  May add lactose free probiotic    Supportive care discussed    Please call if symptoms worsen or are not resolving. never used

## 2022-05-16 RX ORDER — ESCITALOPRAM OXALATE 10 MG/1
10 TABLET ORAL DAILY
Qty: 90 TABLET | Refills: 0 | Status: SHIPPED | OUTPATIENT
Start: 2022-05-16 | End: 2022-07-07

## 2022-05-16 RX ORDER — ESCITALOPRAM OXALATE 10 MG/1
10 TABLET ORAL DAILY
Qty: 90 TABLET | Refills: 0 | Status: CANCELLED | OUTPATIENT
Start: 2022-05-16 | End: 2023-05-11

## 2022-05-16 NOTE — TELEPHONE ENCOUNTER
Refill passed per [place] [employer] [place], [facility] protocol. Requested Prescriptions   Pending Prescriptions Disp Refills    escitalopram (LEXAPRO) 10 MG Oral Tab 90 tablet 0     Sig: Take 1 tablet (10 mg total) by mouth daily.         Psychiatric Non-Scheduled (Anti-Anxiety) Passed - 5/16/2022 11:25 AM        Passed - Appointment in last 6 or next 3 months                  Recent Outpatient Visits              1 month ago Onychomycosis    [address]., DPM    Office Visit    2 months ago Madai Everett MD    Office Visit    12 months ago Donald Hood, [facility], APR    Office Visit    1 year ago [address], [address], [address], [address]    Office Visit    1 year ago Insertion of implantable subdermal contraceptive    Bevin Gilles, [place] Sigurd Scheuermann, MD    Office Visit

## 2022-07-07 RX ORDER — ESCITALOPRAM OXALATE 10 MG/1
10 TABLET ORAL DAILY
Qty: 90 TABLET | Refills: 0 | Status: SHIPPED | OUTPATIENT
Start: 2022-07-07 | End: 2022-07-09

## 2022-07-07 NOTE — TELEPHONE ENCOUNTER
Drew Betzy need her generic Lexapro 10mgsent to [facility] in [facility]# [phone]. She brought a good rx coupon but they need approval from Dr. Daniel Lucio because patient is out of refills. Patient out of medication for 3 days. Urgent request. [phone] with questions.

## 2023-01-09 RX ORDER — ESCITALOPRAM OXALATE 10 MG/1
TABLET ORAL
Qty: 90 TABLET | Refills: 1 | Status: SHIPPED | OUTPATIENT
Start: 2023-01-09

## 2023-02-06 ENCOUNTER — PATIENT MESSAGE (OUTPATIENT)
Dept: FAMILY MEDICINE CLINIC | Facility: CLINIC | Age: 28
End: 2023-02-06

## 2023-02-07 RX ORDER — ONDANSETRON 4 MG/1
4 TABLET, ORALLY DISINTEGRATING ORAL EVERY 8 HOURS PRN
Qty: 20 TABLET | Refills: 0 | Status: SHIPPED | OUTPATIENT
Start: 2023-02-07

## 2023-02-07 NOTE — TELEPHONE ENCOUNTER
Pended prescription, routed to RN triage to run for protocol , thanks. No future appointments. Judson Reed, RN 2/7/2023  1:17 PM CST      ----- Message -----  From: Natan Aditi  Sent: 2/6/2023   9:54 PM CST  To: Em Rn Triage  Subject: Non-Urgent Medical Question                      Hi Dr. Jazmin Brito - I hope you're doing well! Reaching out as I ran out of my ondansetron and I was wondering if you could refill my prescription? I'm happy to come in for a physical if needed but I'm going to [place] Wednesday night, which is what caused me to remember to reach out about it. Please let me know tomorrow when you get the chance. Have a great night! Thank you!

## 2023-02-08 ENCOUNTER — TELEPHONE (OUTPATIENT)
Dept: FAMILY MEDICINE CLINIC | Facility: CLINIC | Age: 28
End: 2023-02-08

## 2023-02-08 RX ORDER — ONDANSETRON 4 MG/1
4 TABLET, FILM COATED ORAL EVERY 8 HOURS PRN
Qty: 20 TABLET | Refills: 0 | Status: SHIPPED | OUTPATIENT
Start: 2023-02-08

## 2023-06-07 ENCOUNTER — OFFICE VISIT (OUTPATIENT)
Dept: OBGYN CLINIC | Facility: CLINIC | Age: 28
End: 2023-06-07

## 2023-06-07 VITALS
BODY MASS INDEX: 23.1 KG/M2 | HEIGHT: 63 IN | WEIGHT: 130.38 LBS | SYSTOLIC BLOOD PRESSURE: 114 MMHG | DIASTOLIC BLOOD PRESSURE: 64 MMHG

## 2023-06-07 DIAGNOSIS — Z30.46 SURVEILLANCE OF PREVIOUSLY PRESCRIBED IMPLANTABLE SUBDERMAL CONTRACEPTIVE: ICD-10-CM

## 2023-06-07 PROBLEM — D68.51 FACTOR V LEIDEN MUTATION (HCC): Status: ACTIVE | Noted: 2023-06-07

## 2023-06-07 PROBLEM — Z97.5 NEXPLANON IN PLACE: Status: ACTIVE | Noted: 2023-06-07

## 2023-06-07 PROCEDURE — 3008F BODY MASS INDEX DOCD: CPT | Performed by: ADVANCED PRACTICE MIDWIFE

## 2023-06-07 PROCEDURE — 99213 OFFICE O/P EST LOW 20 MIN: CPT | Performed by: ADVANCED PRACTICE MIDWIFE

## 2023-06-07 PROCEDURE — 3074F SYST BP LT 130 MM HG: CPT | Performed by: ADVANCED PRACTICE MIDWIFE

## 2023-06-07 PROCEDURE — 3078F DIAST BP <80 MM HG: CPT | Performed by: ADVANCED PRACTICE MIDWIFE

## 2023-06-07 NOTE — PROGRESS NOTES
Patient seen in conjunction with [facility]. I personally witnessed the patient's exam and medical decision making on this date of service. I was physically present in the room for the performance of the E/M service. I have reviewed the CNM student's documentation and findings including history, Exam, and Medical Decision Making, edited the document for accuracy and verify that it represents the clinical findings and services performed.

## 2023-06-09 ENCOUNTER — HOSPITAL ENCOUNTER (OUTPATIENT)
Dept: GENERAL RADIOLOGY | Age: 28
Discharge: HOME | End: 2023-06-09
Attending: ADVANCED PRACTICE MIDWIFE
Payer: COMMERCIAL

## 2023-06-09 DIAGNOSIS — Z30.46 SURVEILLANCE OF PREVIOUSLY PRESCRIBED IMPLANTABLE SUBDERMAL CONTRACEPTIVE: ICD-10-CM

## 2023-06-09 PROCEDURE — 73060 X-RAY EXAM OF HUMERUS: CPT | Performed by: ADVANCED PRACTICE MIDWIFE

## 2023-06-20 ENCOUNTER — OFFICE VISIT (OUTPATIENT)
Dept: OBGYN CLINIC | Facility: CLINIC | Age: 28
End: 2023-06-20
Payer: COMMERCIAL

## 2023-06-20 VITALS
WEIGHT: 131 LBS | HEIGHT: 63 IN | SYSTOLIC BLOOD PRESSURE: 100 MMHG | DIASTOLIC BLOOD PRESSURE: 60 MMHG | BODY MASS INDEX: 23.21 KG/M2

## 2023-06-20 DIAGNOSIS — Z30.46 ENCOUNTER FOR REMOVAL OF SUBDERMAL CONTRACEPTIVE IMPLANT: ICD-10-CM

## 2023-06-20 DIAGNOSIS — Z30.017 INSERTION OF IMPLANTABLE SUBDERMAL CONTRACEPTIVE: ICD-10-CM

## 2023-11-02 RX ORDER — ESCITALOPRAM OXALATE 10 MG/1
10 TABLET ORAL DAILY
Qty: 90 TABLET | Refills: 3 | Status: SHIPPED | OUTPATIENT
Start: 2023-11-02 | End: 2023-12-19

## 2023-11-02 NOTE — TELEPHONE ENCOUNTER
Refill passed per [facility] protocol.     Requested Prescriptions   Pending Prescriptions Disp Refills    ESCITALOPRAM 10 MG Oral Tab [Pharmacy Med Name: ESCITALOPRAM 10 MG TABLET] 90 tablet 0     Sig: TAKE 1 TABLET BY MOUTH EVERY DAY       Psychiatric Non-Scheduled (Anti-Anxiety) Passed - 11/1/2023 12:13 AM        Passed - In person appointment or virtual visit in the past 6 mos or appointment in next 3 mos     Recent Outpatient Visits              4 months ago Insertion of implantable subdermal contraceptive    [facility] - OB/GYN Rosa Maria Wright MD    Office Visit    4 months ago Surveillance of previously prescribed implantable subdermal contraceptive    [facility] May Saldaña CNM    Office Visit    1 year ago Onychomycosis    [facility] [place], Jennifer M., DPM    Office Visit    1 year ago Anxiety    [facility] Jennifer Rogers MD    Office Visit    2 years ago Anxiety    [facility] Leatha Herring APRN    Office Visit          Future Appointments         Provider Department Appt Notes    In 2 weeks Rosa Maria Wright MD [facility] - OB/GYN Pap smear    In 1 month Jennifer Rogers MD [facility] Physical  last px 2018                           [unfilled]      @[email or web address]@

## 2023-11-17 ENCOUNTER — OFFICE VISIT (OUTPATIENT)
Dept: OBGYN CLINIC | Facility: CLINIC | Age: 28
End: 2023-11-17
Payer: COMMERCIAL

## 2023-11-17 VITALS
BODY MASS INDEX: 23.21 KG/M2 | SYSTOLIC BLOOD PRESSURE: 96 MMHG | WEIGHT: 131 LBS | DIASTOLIC BLOOD PRESSURE: 62 MMHG | HEIGHT: 63 IN

## 2023-11-17 DIAGNOSIS — Z01.419 ENCOUNTER FOR GYNECOLOGICAL EXAMINATION WITHOUT ABNORMAL FINDING: ICD-10-CM

## 2023-11-17 DIAGNOSIS — Z12.4 SCREENING FOR CERVICAL CANCER: Primary | ICD-10-CM

## 2023-11-17 PROCEDURE — 3074F SYST BP LT 130 MM HG: CPT | Performed by: OBSTETRICS & GYNECOLOGY

## 2023-11-17 PROCEDURE — 3008F BODY MASS INDEX DOCD: CPT | Performed by: OBSTETRICS & GYNECOLOGY

## 2023-11-17 PROCEDURE — 87624 HPV HI-RISK TYP POOLED RSLT: CPT | Performed by: OBSTETRICS & GYNECOLOGY

## 2023-11-17 PROCEDURE — 88175 CYTOPATH C/V AUTO FLUID REDO: CPT | Performed by: OBSTETRICS & GYNECOLOGY

## 2023-11-17 PROCEDURE — 99395 PREV VISIT EST AGE 18-39: CPT | Performed by: OBSTETRICS & GYNECOLOGY

## 2023-11-17 PROCEDURE — 3078F DIAST BP <80 MM HG: CPT | Performed by: OBSTETRICS & GYNECOLOGY

## 2023-11-20 LAB — HPV I/H RISK 1 DNA SPEC QL NAA+PROBE: NEGATIVE

## 2023-11-22 RX ORDER — METRONIDAZOLE 500 MG/1
500 TABLET ORAL 2 TIMES DAILY
Qty: 14 TABLET | Refills: 0 | Status: SHIPPED | OUTPATIENT
Start: 2023-11-22 | End: 2023-11-29

## 2023-12-19 ENCOUNTER — LAB ENCOUNTER (OUTPATIENT)
Dept: LAB | Age: 28
End: 2023-12-19
Attending: FAMILY MEDICINE
Payer: COMMERCIAL

## 2023-12-19 DIAGNOSIS — E55.9 VITAMIN D DEFICIENCY: ICD-10-CM

## 2023-12-19 DIAGNOSIS — Z00.00 ROUTINE MEDICAL EXAM: ICD-10-CM

## 2023-12-19 LAB
ALBUMIN SERPL-MCNC: 4.6 G/DL (ref 3.2–4.8)
ALBUMIN/GLOB SERPL: 1.5 {RATIO} (ref 1–2)
ALP LIVER SERPL-CCNC: 55 U/L
ALT SERPL-CCNC: 11 U/L
ANION GAP SERPL CALC-SCNC: 1 MMOL/L (ref 0–18)
AST SERPL-CCNC: 15 U/L (ref ?–34)
BASOPHILS # BLD AUTO: 0.07 X10(3) UL (ref 0–0.2)
BASOPHILS NFR BLD AUTO: 1.2 %
BILIRUB SERPL-MCNC: 0.6 MG/DL (ref 0.3–1.2)
BUN BLD-MCNC: 13 MG/DL (ref 9–23)
BUN/CREAT SERPL: 15.1 (ref 10–20)
CALCIUM BLD-MCNC: 9.9 MG/DL (ref 8.7–10.4)
CHLORIDE SERPL-SCNC: 110 MMOL/L (ref 98–112)
CHOLEST SERPL-MCNC: 211 MG/DL (ref ?–200)
CO2 SERPL-SCNC: 26 MMOL/L (ref 21–32)
CREAT BLD-MCNC: 0.86 MG/DL
DEPRECATED RDW RBC AUTO: 42.9 FL (ref 35.1–46.3)
EGFRCR SERPLBLD CKD-EPI 2021: 95 ML/MIN/1.73M2 (ref 60–?)
EOSINOPHIL # BLD AUTO: 0.36 X10(3) UL (ref 0–0.7)
EOSINOPHIL NFR BLD AUTO: 6.2 %
ERYTHROCYTE [DISTWIDTH] IN BLOOD BY AUTOMATED COUNT: 12.3 % (ref 11–15)
FASTING PATIENT LIPID ANSWER: YES
FASTING STATUS PATIENT QL REPORTED: YES
GLOBULIN PLAS-MCNC: 3 G/DL (ref 2.8–4.4)
GLUCOSE BLD-MCNC: 100 MG/DL (ref 70–99)
HCT VFR BLD AUTO: 43.5 %
HDLC SERPL-MCNC: 61 MG/DL (ref 40–59)
HGB BLD-MCNC: 14.2 G/DL
IMM GRANULOCYTES # BLD AUTO: 0.01 X10(3) UL (ref 0–1)
IMM GRANULOCYTES NFR BLD: 0.2 %
LDLC SERPL CALC-MCNC: 139 MG/DL (ref ?–100)
LYMPHOCYTES # BLD AUTO: 1.46 X10(3) UL (ref 1–4)
LYMPHOCYTES NFR BLD AUTO: 25 %
MCH RBC QN AUTO: 30.8 PG (ref 26–34)
MCHC RBC AUTO-ENTMCNC: 32.6 G/DL (ref 31–37)
MCV RBC AUTO: 94.4 FL
MONOCYTES # BLD AUTO: 0.38 X10(3) UL (ref 0.1–1)
MONOCYTES NFR BLD AUTO: 6.5 %
NEUTROPHILS # BLD AUTO: 3.55 X10 (3) UL (ref 1.5–7.7)
NEUTROPHILS # BLD AUTO: 3.55 X10(3) UL (ref 1.5–7.7)
NEUTROPHILS NFR BLD AUTO: 60.9 %
NONHDLC SERPL-MCNC: 150 MG/DL (ref ?–130)
OSMOLALITY SERPL CALC.SUM OF ELEC: 284 MOSM/KG (ref 275–295)
PLATELET # BLD AUTO: 277 10(3)UL (ref 150–450)
POTASSIUM SERPL-SCNC: 4.4 MMOL/L (ref 3.5–5.1)
PROT SERPL-MCNC: 7.6 G/DL (ref 5.7–8.2)
RBC # BLD AUTO: 4.61 X10(6)UL
SODIUM SERPL-SCNC: 137 MMOL/L (ref 136–145)
TRIGL SERPL-MCNC: 62 MG/DL (ref 30–149)
TSI SER-ACNC: 0.82 MIU/ML (ref 0.55–4.78)
VIT B12 SERPL-MCNC: 772 PG/ML (ref 211–911)
VIT D+METAB SERPL-MCNC: 28.1 NG/ML (ref 30–100)
VLDLC SERPL CALC-MCNC: 11 MG/DL (ref 0–30)
WBC # BLD AUTO: 5.8 X10(3) UL (ref 4–11)

## 2023-12-19 PROCEDURE — 82607 VITAMIN B-12: CPT

## 2023-12-19 PROCEDURE — 36415 COLL VENOUS BLD VENIPUNCTURE: CPT

## 2023-12-19 PROCEDURE — 85025 COMPLETE CBC W/AUTO DIFF WBC: CPT

## 2023-12-19 PROCEDURE — 80061 LIPID PANEL: CPT

## 2023-12-19 PROCEDURE — 82306 VITAMIN D 25 HYDROXY: CPT

## 2023-12-19 PROCEDURE — 84443 ASSAY THYROID STIM HORMONE: CPT

## 2023-12-19 PROCEDURE — 80053 COMPREHEN METABOLIC PANEL: CPT

## 2023-12-21 NOTE — PROGRESS NOTES
Cholesterol is a bit elevated this time and decreased vitamin D levels.  Focus on healthy eating - avoid fast foods, red meats, cheese, etc. Take extra over the counter vitamin D 2000 units daily. - Dr. Martin Murphy

## 2024-10-18 NOTE — TELEPHONE ENCOUNTER
Please review. Protocol Failed; No Protocol    Requested Prescriptions   Pending Prescriptions Disp Refills    ondansetron 4 MG Oral Tablet Dispersible 30 tablet 1     Sig: Place 1 tablet (4 mg total) under the tongue every 8 (eight) hours as needed for Nausea.       There is no refill protocol information for this order            Future Appointments         Provider Department Appt Notes    In 1 month Jennifer Rogers MD [facility], [place] My ADHD, starting an adderral prescription for work.          Recent Outpatient Visits              10 months ago Attention deficit disorder, unspecified hyperactivity presence    [facility], Jennifer Streeter MD    Office Visit    11 months ago Screening for cervical cancer    [facility] - OB/GYN Rosa Maria Wright MD    Office Visit    1 year ago Insertion of implantable subdermal contraceptive    [facility] - OB/GYN Rosa Maria Wright MD    Office Visit    1 year ago Surveillance of previously prescribed implantable subdermal contraceptive    [facility] - Midwifery May Saldaña CNM    Office Visit    2 years ago Onychomycosis    [facility], [place], Jennifer M., DPM    Office Visit

## 2024-10-19 RX ORDER — ONDANSETRON 4 MG/1
4 TABLET, ORALLY DISINTEGRATING ORAL EVERY 8 HOURS PRN
Qty: 30 TABLET | Refills: 1 | Status: SHIPPED | OUTPATIENT
Start: 2024-10-19

## 2025-01-31 DIAGNOSIS — F98.8 ATTENTION DEFICIT DISORDER, UNSPECIFIED TYPE: ICD-10-CM

## 2025-02-04 RX ORDER — DEXTROAMPHETAMINE SACCHARATE, AMPHETAMINE ASPARTATE, DEXTROAMPHETAMINE SULFATE AND AMPHETAMINE SULFATE 2.5; 2.5; 2.5; 2.5 MG/1; MG/1; MG/1; MG/1
10 TABLET ORAL 2 TIMES DAILY
Qty: 60 TABLET | Refills: 0 | OUTPATIENT
Start: 2025-02-04 | End: 2025-03-06

## 2025-02-04 RX ORDER — ONDANSETRON 4 MG/1
4 TABLET, ORALLY DISINTEGRATING ORAL EVERY 8 HOURS PRN
Qty: 30 TABLET | Refills: 1 | Status: SHIPPED | OUTPATIENT
Start: 2025-02-04

## 2025-02-04 NOTE — TELEPHONE ENCOUNTER
Please review; protocol failed/ has no protocol    Requested Prescriptions   Pending Prescriptions Disp Refills    ondansetron 4 MG Oral Tablet Dispersible 30 tablet 1     Sig: Place 1 tablet (4 mg total) under the tongue every 8 (eight) hours as needed for Nausea.       There is no refill protocol information for this order      Refused Prescriptions Disp Refills    amphetamine-dextroamphetamine (ADDERALL) 10 MG Oral Tab 60 tablet 0     Sig: Take 1 tablet (10 mg total) by mouth 2 (two) times daily.       Controlled Substance Medication Failed - 2/4/2025  2:38 PM        Failed - This medication is a controlled substance - forward to provider to refill        Passed - Medication is active on med list           Recent Outpatient [facility]its              [facility]    [facility]    1 year ago Attention deficit disorder, unspecified hyperactivity presence    [facility]    [facility]    1 year ago Screening for cervical cancer    [facility] - OB/GYN Rosa Maria Wright MD    Office Visit    1 year ago Insertion of implantable subdermal contraceptive    [facility] - OB/GYN Rosa Maria Wright MD    Office Visit    1 year ago Surveillance of previously prescribed implantable subdermal contraceptive    [facility] - Midwifery May Saldaña CNM    Office Visit

## 2025-03-14 RX ORDER — ESCITALOPRAM OXALATE 10 MG/1
10 TABLET ORAL DAILY
Qty: 90 TABLET | Refills: 4 | Status: SHIPPED | OUTPATIENT
Start: 2025-03-14

## 2025-03-14 NOTE — TELEPHONE ENCOUNTER
Refill passed per [facility] protocol.     Requested Prescriptions   Pending Prescriptions Disp Refills    escitalopram 10 MG Oral Tab 90 tablet 4     Sig: Take 1 tablet (10 mg total) by mouth daily.       Psychiatric Non-Scheduled (Anti-Anxiety) Passed - 3/14/2025  9:35 AM        Passed - In person appointment or virtual visit in the past 6 mos or appointment in next 3 mos     Recent Outpatient Visits              4 months [facility]    [facility]    1 year ago Attention deficit disorder, unspecified hyperactivity presence    [facility]    [facility]    1 year ago Screening for cervical cancer    [facility] - OB/GYN Rosa Maria Wright MD    Office Visit    1 year ago Insertion of implantable subdermal contraceptive    [facility] - OB/GYN Rosa Maria Wright MD    Office Visit    1 year ago Surveillance of previously prescribed implantable subdermal contraceptive    [facility] - Midwifery May Saldaña CNM    Office Visit          Future Appointments         Provider Department Appt Notes    In 2 weeks Jennifer Rogers MD [facility] The lymph node on left back side of my neck is swollen and I want to get it checked out.                    Passed - Depression Screening completed within the past 12 months        Passed - Medication is active on med list             [unfilled]      [unfilled]

## 2025-03-18 RX ORDER — ESCITALOPRAM OXALATE 10 MG/1
10 TABLET ORAL DAILY
Qty: 90 TABLET | Refills: 4 | OUTPATIENT
Start: 2025-03-18

## 2025-03-18 NOTE — TELEPHONE ENCOUNTER
Duplicate request, previously addressed.    Sent to pharmacy on 03/14/2025 for 90 day supply with 4 refills.

## 2025-05-04 ENCOUNTER — PATIENT MESSAGE (OUTPATIENT)
Dept: FAMILY MEDICINE CLINIC | Facility: CLINIC | Age: 30
End: 2025-05-04

## 2025-08-12 DIAGNOSIS — F98.8 ATTENTION DEFICIT DISORDER, UNSPECIFIED TYPE: ICD-10-CM

## 2025-08-14 RX ORDER — DEXTROAMPHETAMINE SACCHARATE, AMPHETAMINE ASPARTATE, DEXTROAMPHETAMINE SULFATE AND AMPHETAMINE SULFATE 2.5; 2.5; 2.5; 2.5 MG/1; MG/1; MG/1; MG/1
10 TABLET ORAL 2 TIMES DAILY
Qty: 60 TABLET | Refills: 0 | OUTPATIENT
Start: 2025-08-14 | End: 2025-09-13

## 2025-08-14 RX ORDER — ESCITALOPRAM OXALATE 10 MG/1
10 TABLET ORAL DAILY
Qty: 90 TABLET | Refills: 4 | OUTPATIENT
Start: 2025-08-14

## (undated) DIAGNOSIS — F41.9 ANXIETY: ICD-10-CM

## (undated) NOTE — MR AVS SNAPSHOT
After Visit Summary   5/28/2020    Elif Watt    MRN: [id number]           Visit Information     Date & Time  5/28/2020 10:00 AM Provider  Belén Davis MD [address] [place] [address], [address] [place] [address],[address], [employer]  Phone  3 Avoid over sized portions. Don’t eat while when you’re bored.      EAT THESE FOODS MORE OFTEN: EAT THESE FOODS LESS OFTEN:   Make half your plate fruits and vegetables Highly refined, white starches including white bread, rice and pasta   Eat plenty of pr non-emergency, consider your options before heading to an ER. VIDEO VISITS  Visit face-to-face with a [facility] physician or   SHERYL using your mobile device or computer   using [email or web address].    e-VISITS  Communicate with a [facility] Physician or SHERYL online.  The physician andria

## (undated) NOTE — MR AVS SNAPSHOT
[address], [address]  [address] [facility]  [phone]               Thank you for choosing us for your health care visit with Garrett Trejo DO.   We are glad to serve you and happy to provide you with this summary * Methylphenidate HCl ER 36 MG Tbcr   Take 1 tablet (36 mg total) by mouth daily.    Commonly known as:  CONCERTA   Start taking on:  4/1/2017           SINGULAIR 10 MG Tabs   Generic drug:  Montelukast Sodium   One tablet daily           ZYRTEC 10 MG T

## (undated) NOTE — LETTER
January 2, 2019     [address] [place] [address]      Dear David Henderson:  Below are the results from your recent visit:  The h. Pylori test was negative. Can restart taking the omeprazole.     Resulted Orders   HELICOBACTER PYLORI BR

## (undated) NOTE — LETTER
AUTHORIZATION FOR SURGICAL OPERATION OR OTHER PROCEDURE    1. I hereby authorize Lucila Kaufman [facility], and "[facility]" staff assigned to my case to perform the following operation and/or procedure at the [place] [employer] [facility]:  Nexplanon replacement. 2.  My physician has explained the nature and purpose of the operation or other procedure, possible alternative methods of treatment, the risks involved, and the possibility of complication to me. I acknowledge that no guarantee has been made as to the result that may be obtained. 3.  I recognize that, during the course of this operation, or other procedure, unforseen conditions may necessitate additional or different procedure than those listed above. I, therefore, further authorize and request that the above named physician, his/her physician assistants or designees perform such procedures as are, in his/her professional opinion, necessary and desirable. 4.  Any tissue or organs removed in the operation or other procedure may be disposed of by and at the discretion of the [facility] and [facility]. 5.  I understand that in the event of a medical emergency, I will be transported by local paramedics to [facility] or other hospital emergency department. 6.  I certify that I have read and fully understand the above consent to operation and/or other procedure. 7.  I acknowledge that my physician has explained sedation/analgesia administration to me including the risks and benefits. I consent to the administration of sedation/analgesia as may be necessary or desirable in the judgement of my physician. Witness signature: ___________________________________________________ Date:  ______/______/_____                    Time:  ________ A. M.  P.M.        Patient Name:  ______________________________________________________  (please print)      Patient signature: ___________________________________________________             Relationship to Patient:           []  Parent    Responsible person                          []  Spouse  In case of minor or                    [] Other  _____________   Incompetent name:  __________________________________________________                               (please print)      _____________      Responsible person  In case of minor or  Incompetent signature:  _______________________________________________    Statement of Physician  My signature below affirms that prior to the time of the procedure, I have explained to the patient and/or his/her guardian, the risks and benefits involved in the proposed treatment and any reasonable alternative to the proposed treatment. I have also explained the risks and benefits involved in the refusal of the proposed treatment and have answered the patient's questions.                         Date:  ______/______/_______  Provider                      Signature:  __________________________________________________________       Time:  ___________ A.M    P.M.

## (undated) NOTE — LETTER
AUTHORIZATION FOR SURGICAL OPERATION OR OTHER PROCEDURE    1.  I hereby authorize Dr. Adele Carlin, and [facility] staff assigned to my case to perform the following operation and/or procedure at the [facility]:    __________Nexplanon removal and r Patient Name:  ______________________________________________________  (please print)      Patient signature:  ___________________________________________________             Relationship to Patient:           []  Parent    Responsible person

## (undated) NOTE — Clinical Note
Sandi Montejo - thank you for sending David Purpura. I suspect she has cyclic vomiting syndrome. Will rule out other causes with EGD and ab ultrasound before starting treatment.  Thanks -[employer]

## (undated) NOTE — Clinical Note
AUTHORIZATION FOR SURGICAL OPERATION OR OTHER PROCEDURE    1.  I hereby authorize Dr. Barbara New MD and [facility] staff assigned to my case to perform the following operation and/or procedure at the [facility]:    ________________________ Patient signature:  ___________________________________________________             Relationship to Patient:             Parent    Responsible person                            Spouse  In case of minor or                     Other  _____________   Incompet